# Patient Record
Sex: MALE | Race: WHITE | NOT HISPANIC OR LATINO | Employment: OTHER | URBAN - NONMETROPOLITAN AREA
[De-identification: names, ages, dates, MRNs, and addresses within clinical notes are randomized per-mention and may not be internally consistent; named-entity substitution may affect disease eponyms.]

---

## 2017-06-01 ENCOUNTER — TELEPHONE (OUTPATIENT)
Dept: SURGERY | Facility: CLINIC | Age: 62
End: 2017-06-01

## 2017-06-14 NOTE — TELEPHONE ENCOUNTER
Pt is scheduled for colonoscopy screening on August 9, 2017 at [facility]. Instructions mailed today. Thanks.

## 2017-06-15 ENCOUNTER — PREP FOR SURGERY (OUTPATIENT)
Dept: OTHER | Facility: HOSPITAL | Age: 62
End: 2017-06-15

## 2017-06-15 DIAGNOSIS — Z12.11 ENCOUNTER FOR SCREENING COLONOSCOPY: Primary | ICD-10-CM

## 2017-06-15 RX ORDER — SODIUM CHLORIDE 9 MG/ML
30 INJECTION, SOLUTION INTRAVENOUS CONTINUOUS PRN
Status: CANCELLED | OUTPATIENT
Start: 2017-06-15

## 2017-06-16 ENCOUNTER — HOSPITAL ENCOUNTER (OUTPATIENT)
Facility: HOSPITAL | Age: 62
Setting detail: HOSPITAL OUTPATIENT SURGERY
End: 2017-06-16
Attending: SURGERY | Admitting: SURGERY

## 2017-06-27 ENCOUNTER — OFFICE VISIT (OUTPATIENT)
Dept: UROLOGY | Facility: CLINIC | Age: 62
End: 2017-06-27

## 2017-06-27 VITALS
HEART RATE: 78 BPM | RESPIRATION RATE: 18 BRPM | DIASTOLIC BLOOD PRESSURE: 75 MMHG | SYSTOLIC BLOOD PRESSURE: 146 MMHG | OXYGEN SATURATION: 96 % | TEMPERATURE: 98.8 F

## 2017-06-27 DIAGNOSIS — R97.20 ELEVATED PROSTATE SPECIFIC ANTIGEN (PSA): Primary | ICD-10-CM

## 2017-06-27 DIAGNOSIS — N40.1 BPH (BENIGN PROSTATIC HYPERTROPHY) WITH URINARY OBSTRUCTION: ICD-10-CM

## 2017-06-27 DIAGNOSIS — N21.0 BLADDER CALCULUS: ICD-10-CM

## 2017-06-27 DIAGNOSIS — N13.8 BPH (BENIGN PROSTATIC HYPERTROPHY) WITH URINARY OBSTRUCTION: ICD-10-CM

## 2017-06-27 LAB
BILIRUB BLD-MCNC: NEGATIVE MG/DL
CLARITY, POC: CLEAR
COLOR UR: YELLOW
GLUCOSE UR STRIP-MCNC: NEGATIVE MG/DL
KETONES UR QL: NEGATIVE
LEUKOCYTE EST, POC: NEGATIVE
NITRITE UR-MCNC: NEGATIVE MG/ML
PH UR: 7.5 [PH] (ref 5–8)
PROT UR STRIP-MCNC: NEGATIVE MG/DL
RBC # UR STRIP: NEGATIVE /UL
SP GR UR: 1.01 (ref 1–1.03)
UROBILINOGEN UR QL: NORMAL

## 2017-06-27 PROCEDURE — 76857 US EXAM PELVIC LIMITED: CPT | Performed by: UROLOGY

## 2017-06-27 PROCEDURE — 99203 OFFICE O/P NEW LOW 30 MIN: CPT | Performed by: UROLOGY

## 2017-06-27 PROCEDURE — 81003 URINALYSIS AUTO W/O SCOPE: CPT | Performed by: UROLOGY

## 2017-06-27 RX ORDER — DOXAZOSIN MESYLATE 4 MG/1
TABLET ORAL
COMMUNITY
Start: 2017-04-30 | End: 2017-06-27 | Stop reason: SDUPTHER

## 2017-06-27 RX ORDER — CLOBETASOL PROPIONATE 0.46 MG/ML
SOLUTION TOPICAL
COMMUNITY
Start: 2017-05-26

## 2017-06-27 RX ORDER — ALFUZOSIN HYDROCHLORIDE 10 MG/1
10 TABLET, EXTENDED RELEASE ORAL DAILY
Qty: 30 TABLET | Refills: 11 | Status: SHIPPED | OUTPATIENT
Start: 2017-06-27 | End: 2017-07-17

## 2017-06-27 RX ORDER — TAMSULOSIN HYDROCHLORIDE 0.4 MG/1
CAPSULE ORAL
COMMUNITY
Start: 2017-06-11 | End: 2017-06-27 | Stop reason: SDUPTHER

## 2017-06-27 RX ORDER — FINASTERIDE 5 MG/1
5 TABLET, FILM COATED ORAL DAILY
Qty: 30 TABLET | Refills: 11 | Status: SHIPPED | OUTPATIENT
Start: 2017-06-27 | End: 2018-03-23 | Stop reason: SDUPTHER

## 2017-06-27 NOTE — PROGRESS NOTES
Chief Complaint  Elevated PSA (Patient is here for elevated psa.)        JOLEEN Beltrán is a 61 y.o. male who     Vitals:    06/27/17 1125   BP: 146/75   Pulse: 78   Resp: 18   Temp: 98.8 °F (37.1 °C)   SpO2: 96%       Past Medical History  Past Medical History:   Diagnosis Date   • Arthritis    • Kidney stone        Past Surgical History  Past Surgical History:   Procedure Laterality Date   • KIDNEY STONE SURGERY         Medications  has a current medication list which includes the following prescription(s): clobetasol, doxazosin, and tamsulosin.      Allergies  No Known Allergies    Social History  Social History     Social History Narrative   • No narrative on file       Family History  He has no family history of bladder or kidney cancer  He has no family history of kidney stones      AUA Symptom Score:      Review of Systems  Review of Systems    Physical Exam  Physical Exam    Labs Recent and today in the office:  Results for orders placed or performed in visit on 06/27/17   POC Urinalysis Dipstick, Automated   Result Value Ref Range    Color Yellow Yellow, Straw, Dark Yellow, Alison    Clarity, UA Clear Clear    Glucose, UA Negative Negative, 1000 mg/dL (3+) mg/dL    Bilirubin Negative Negative    Ketones, UA Negative Negative    Specific Gravity  1.015 1.005 - 1.030    Blood, UA Negative Negative    pH, Urine 7.5 5.0 - 8.0    Protein, POC Negative Negative mg/dL    Urobilinogen, UA Normal Normal    Leukocytes Negative Negative    Nitrite, UA Negative Negative         Assessment & Plan

## 2017-06-27 NOTE — PROGRESS NOTES
[facility]- UROLOGY  [address], [address]  [place], [place] [address]  [phone]      06/27/2017    Kamar Zamoraots  1955        Pelvic Ultrasound with PVR    A transabdominal pelvic ultrasound was performed using a 3.5 MHz transducer of a B-K Mcgovern through the suprapubic area.     The purpose of the study was to investigate the patient's voiding difficulties.  There was mild bladder wall thickening noted.  There were no intravesical filling defects seen except for a 5 mm bladder calculus.  The post void residual of 67.6 ml was noted.  Prostate was huge and homogeneous and was noted to be 1-3 grams.  There was a protrusion of the prostate into the bladder.  Ultrasound images will be scanned into the chart for reference.       CPT code 23128        Performed by Jj Barksdale MD

## 2017-06-27 NOTE — PROGRESS NOTES
Chief Complaint  No chief complaint on file.        JOLEEN Beltrán is a 61 y.o. male who is referred for evaluation of his prostate after a recent PSA was found elevated to 7.3.  He has been formally evaluated both at the [facility] and by Dr. Wiley and was found to have a PSA of 7.2 in 2013.  He has moderate symptoms of outlet obstruction related to his enlarged prostate and describes an AUA obstructive index of 30 despite taking Flomax.  He also complains of lack of ejaculation since starting Flomax.  His past history significant for kidney stones that required operative intervention as well as bladder stones which have been treated both endoscopically and open.  On his last cystoscopy he was told there was a residual bladder stone but he did not go back for follow-up.    There were no vitals filed for this visit.    Past Medical History  No past medical history on file.    Past Surgical History  No past surgical history on file.    Medications  currently has no medications in their medication list.      Allergies  Allergies not on file    Social History  Social History     Social History Narrative       Family History  He has no family history of bladder or kidney cancer  He has no family history of kidney stones      AUA Symptom Score:      Review of Systems  Review of Systems  Positive for painful joints frequent urination and decreased stream involuntary urination easy bleeding and bruising but negative and other categories.  Physical Exam  Physical Exam   Constitutional: He is oriented to person, place, and time. He appears well-developed and well-nourished.   HENT:   Head: Normocephalic and atraumatic.   Neck: Normal range of motion.   Pulmonary/Chest: Effort normal. No respiratory distress.   Abdominal: Soft. He exhibits no distension and no mass. There is no tenderness. No hernia.   Musculoskeletal: Normal range of motion.   Lymphadenopathy:     He has no cervical adenopathy.   Neurological: He is  alert and oriented to person, place, and time.   Skin: Skin is warm and dry.   Psychiatric: He has a normal mood and affect. His behavior is normal.   Vitals reviewed.      Labs Recent and today in the office:  No results found for this or any previous visit.      Assessment & Plan  Digital rectal exam is benign and reveals a huge prostate documented on pelvic ultrasound to be 123 g.  With a PSA of 7.3 his PSA density is well within the benign range.  I would not recommend biopsy at this time but he needs to be on medication to shrink his huge prostate and his PSA will probably come down to normal.  If it continues to progress on Proscar and then I would recommend proceeding with biopsy at that time.  I would also recommend discontinuing the Flomax and take Uroxatral since this has less effect on his sexual function which is a concern.    He does have symptoms of hypogonadism and I suggested he return in 3 months for a total and free PSA along with total and free testosterone level.

## 2017-07-04 ENCOUNTER — HOSPITAL ENCOUNTER (EMERGENCY)
Facility: HOSPITAL | Age: 62
Discharge: HOME | End: 2017-07-04
Attending: EMERGENCY MEDICINE | Admitting: EMERGENCY MEDICINE

## 2017-07-04 VITALS
WEIGHT: 190 LBS | DIASTOLIC BLOOD PRESSURE: 88 MMHG | BODY MASS INDEX: 26.6 KG/M2 | HEART RATE: 63 BPM | HEIGHT: 71 IN | TEMPERATURE: 98 F | SYSTOLIC BLOOD PRESSURE: 155 MMHG | OXYGEN SATURATION: 97 % | RESPIRATION RATE: 20 BRPM

## 2017-07-04 DIAGNOSIS — R33.9 URINARY RETENTION: Primary | ICD-10-CM

## 2017-07-04 LAB
ALBUMIN SERPL-MCNC: 4.4 G/DL (ref 3.5–5)
ALBUMIN/GLOB SERPL: 1.4 G/DL (ref 1–2)
ALP SERPL-CCNC: 64 U/L (ref 38–126)
ALT SERPL W P-5'-P-CCNC: 37 U/L (ref 13–69)
ANION GAP SERPL CALCULATED.3IONS-SCNC: 15.1 MMOL/L
AST SERPL-CCNC: 31 U/L (ref 15–46)
BASOPHILS # BLD AUTO: 0.05 10*3/MM3 (ref 0–0.2)
BASOPHILS NFR BLD AUTO: 0.7 % (ref 0–2.5)
BILIRUB SERPL-MCNC: 0.5 MG/DL (ref 0.2–1.3)
BILIRUB UR QL STRIP: NEGATIVE
BUN BLD-MCNC: 17 MG/DL (ref 7–20)
BUN/CREAT SERPL: 18.9 (ref 6.3–21.9)
CALCIUM SPEC-SCNC: 9.6 MG/DL (ref 8.4–10.2)
CHLORIDE SERPL-SCNC: 103 MMOL/L (ref 98–107)
CLARITY UR: CLEAR
CO2 SERPL-SCNC: 24 MMOL/L (ref 26–30)
COLOR UR: NORMAL
CREAT BLD-MCNC: 0.9 MG/DL (ref 0.6–1.3)
DEPRECATED RDW RBC AUTO: 40.5 FL (ref 37–54)
EOSINOPHIL # BLD AUTO: 0.34 10*3/MM3 (ref 0–0.7)
EOSINOPHIL NFR BLD AUTO: 4.5 % (ref 0–7)
ERYTHROCYTE [DISTWIDTH] IN BLOOD BY AUTOMATED COUNT: 12.7 % (ref 11.5–14.5)
GFR SERPL CREATININE-BSD FRML MDRD: 85 ML/MIN/1.73
GLOBULIN UR ELPH-MCNC: 3.2 GM/DL
GLUCOSE BLD-MCNC: 102 MG/DL (ref 74–98)
GLUCOSE UR STRIP-MCNC: NEGATIVE MG/DL
HCT VFR BLD AUTO: 43.6 % (ref 42–52)
HGB BLD-MCNC: 15.2 G/DL (ref 14–18)
HGB UR QL STRIP.AUTO: NEGATIVE
HOLD SPECIMEN: NORMAL
IMM GRANULOCYTES # BLD: 0.03 10*3/MM3 (ref 0–0.06)
IMM GRANULOCYTES NFR BLD: 0.4 % (ref 0–0.6)
KETONES UR QL STRIP: NEGATIVE
LEUKOCYTE ESTERASE UR QL STRIP.AUTO: NEGATIVE
LYMPHOCYTES # BLD AUTO: 2.29 10*3/MM3 (ref 0.6–3.4)
LYMPHOCYTES NFR BLD AUTO: 30.5 % (ref 10–50)
MCH RBC QN AUTO: 30.6 PG (ref 27–31)
MCHC RBC AUTO-ENTMCNC: 34.9 G/DL (ref 30–37)
MCV RBC AUTO: 87.7 FL (ref 80–94)
MONOCYTES # BLD AUTO: 0.58 10*3/MM3 (ref 0–0.9)
MONOCYTES NFR BLD AUTO: 7.7 % (ref 0–12)
NEUTROPHILS # BLD AUTO: 4.23 10*3/MM3 (ref 2–6.9)
NEUTROPHILS NFR BLD AUTO: 56.2 % (ref 37–80)
NITRITE UR QL STRIP: NEGATIVE
NRBC BLD MANUAL-RTO: 0 /100 WBC (ref 0–0)
PH UR STRIP.AUTO: 7.5 [PH] (ref 5–8)
PLATELET # BLD AUTO: 206 10*3/MM3 (ref 130–400)
PMV BLD AUTO: 10.5 FL (ref 6–12)
POTASSIUM BLD-SCNC: 4.1 MMOL/L (ref 3.5–5.1)
PROT SERPL-MCNC: 7.6 G/DL (ref 6.3–8.2)
PROT UR QL STRIP: NEGATIVE
RBC # BLD AUTO: 4.97 10*6/MM3 (ref 4.7–6.1)
SODIUM BLD-SCNC: 138 MMOL/L (ref 137–145)
SP GR UR STRIP: 1.01 (ref 1–1.03)
UROBILINOGEN UR QL STRIP: NORMAL
WBC NRBC COR # BLD: 7.52 10*3/MM3 (ref 4.8–10.8)
WHOLE BLOOD HOLD SPECIMEN: NORMAL

## 2017-07-04 PROCEDURE — 81003 URINALYSIS AUTO W/O SCOPE: CPT | Performed by: EMERGENCY MEDICINE

## 2017-07-04 PROCEDURE — 85025 COMPLETE CBC W/AUTO DIFF WBC: CPT | Performed by: EMERGENCY MEDICINE

## 2017-07-04 PROCEDURE — 80053 COMPREHEN METABOLIC PANEL: CPT | Performed by: EMERGENCY MEDICINE

## 2017-07-04 PROCEDURE — 99283 EMERGENCY DEPT VISIT LOW MDM: CPT

## 2017-07-04 RX ORDER — NAPROXEN 500 MG/1
500 TABLET ORAL 2 TIMES DAILY WITH MEALS
COMMUNITY

## 2017-07-05 NOTE — ED NOTES
Patient aware of MD recommendation for straight cath to drain urine prior to discharge, patient refused.      Juan Alberto Paul RN  07/04/17 8568

## 2017-07-05 NOTE — ED PROVIDER NOTES
TRIAGE CHIEF COMPLAINT:     Nursing and triage notes reviewed    Chief Complaint   Patient presents with   • Difficulty Urinating      HPI: Kamar Beltrán is a 61 y.o. male who presents to the emergency department complaining of Difficulty urinating.  Patient states he has a history of an enlarged prostate for which she is followed by urology.  He saw his urologist last Tuesday and was started on 2 medicines for his enlarged prostate.  He states since Saturday, approximately for the past 5 days has had difficulty in urinating.  States she has some pressure in the suprapubic area and some slight aching in his left flank.  He denies having any blood in the urine he has been able to produce.  States he has a history of kidney stones but his discomfort does not feel like a kidney stone.  Denies any nausea or vomiting.  No fevers or chills.  No chest pain or shortness of breath.     REVIEW OF SYSTEMS: Otherwise negative unless stated above     PAST MEDICAL HISTORY:   Past Medical History:   Diagnosis Date   • Arthritis    • Enlarged prostate    • Kidney stone         FAMILY HISTORY:   Family History   Problem Relation Age of Onset   • Heart disease Mother    • Hypertension Mother    • Diabetes Mother         SOCIAL HISTORY:   Social History     Social History   • Marital status:      Spouse name: N/A   • Number of children: N/A   • Years of education: N/A     Occupational History   • Not on file.     Social History Main Topics   • Smoking status: Never Smoker   • Smokeless tobacco: Not on file   • Alcohol use No   • Drug use: No   • Sexual activity: No     Other Topics Concern   • Not on file     Social History Narrative   • No narrative on file        SURGICAL HISTORY:   Past Surgical History:   Procedure Laterality Date   • KIDNEY STONE SURGERY          CURRENT MEDICATIONS:      Medication List      ASK your doctor about these medications          alfuzosin 10 MG 24 hr tablet   Commonly known as:  UROXATRAL    Take 1 tablet by mouth Daily.       clobetasol 0.05 % external solution   Commonly known as:  TEMOVATE       finasteride 5 MG tablet   Commonly known as:  PROSCAR   Take 1 tablet by mouth Daily.       naproxen 500 MG tablet   Commonly known as:  NAPROSYN            ALLERGIES: Review of patient's allergies indicates no known allergies.     PHYSICAL EXAM:   VITAL SIGNS:   Vitals:    07/04/17 2059   BP:    Pulse:    Resp: 20   Temp:    SpO2:       CONSTITUTIONAL: Awake, oriented, appears non-toxic   HENT: Atraumatic, normocephalic, oral mucosa pink and moist, airway patent.   EYES: Conjunctiva clear   NECK: Trachea midline, non-tender, supple   CARDIOVASCULAR: Normal heart rate, Normal rhythm, No murmurs, rubs, gallops   PULMONARY/CHEST: Clear to auscultation, no rhonchi, wheezes, or rales. Symmetrical breath sounds.  ABDOMINAL: Non-distended, soft, mild suprapubic tenderness - no rebound or guarding.  Mild left flank discomfort to palpation.  NEUROLOGIC: Non-focal, moving all four extremities, no gross sensory or motor deficits.   EXTREMITIES: No clubbing, cyanosis, or edema   SKIN: Warm, Dry, No erythema, No rash     ED COURSE / MEDICAL DECISION MAKING:   Kamar Beltrán is a 61 y.o. male who presents to the emergency department for evaluation of urinary retention.  Vital signs stable on arrival.  Physical exam reveals some slight suprapubic tenderness to palpation.  Did not feel imaging is currently indicated but did obtain some basic labs.  Also obtained a bladder scan which revealed approximately 200 cc of urine in the bladder.  Patient was able to void some on his own.  Basic labs were unremarkable including kidney function.  No sign of urinary infection.  After discussion with patient I recommended placement of a Cruz catheter at least for decompression of the bladder however the patient refused.  He requested to go back on Flomax as he thought this worked better and will follow-up with his urologist.  As  patient to return at any point for worsening of symptoms.    DECISION TO DISCHARGE/ADMIT: see ED care timeline     FINAL IMPRESSION:   1 -- urinary retention   2 --   3 --     Electronically signed by: Aby Claros MD, 7/4/2017 9:07 PM       Aby Claros MD  07/04/17 2237

## 2017-07-17 ENCOUNTER — OFFICE VISIT (OUTPATIENT)
Dept: UROLOGY | Facility: CLINIC | Age: 62
End: 2017-07-17

## 2017-07-17 VITALS
HEART RATE: 68 BPM | SYSTOLIC BLOOD PRESSURE: 125 MMHG | WEIGHT: 190 LBS | HEIGHT: 71 IN | BODY MASS INDEX: 26.6 KG/M2 | TEMPERATURE: 98.8 F | DIASTOLIC BLOOD PRESSURE: 80 MMHG | OXYGEN SATURATION: 94 %

## 2017-07-17 DIAGNOSIS — N40.1 BPH (BENIGN PROSTATIC HYPERTROPHY) WITH URINARY OBSTRUCTION: ICD-10-CM

## 2017-07-17 DIAGNOSIS — R33.8 ACUTE URINARY RETENTION: Primary | ICD-10-CM

## 2017-07-17 DIAGNOSIS — N13.8 BPH (BENIGN PROSTATIC HYPERTROPHY) WITH URINARY OBSTRUCTION: ICD-10-CM

## 2017-07-17 PROCEDURE — 99213 OFFICE O/P EST LOW 20 MIN: CPT | Performed by: UROLOGY

## 2017-07-17 RX ORDER — TAMSULOSIN HYDROCHLORIDE 0.4 MG/1
CAPSULE ORAL
COMMUNITY
Start: 2017-07-08

## 2017-07-17 NOTE — PROGRESS NOTES
Chief Complaint  Urinary Retention (Patient has been taking flomax and would like to have nesbitt removed.)        JOLEEN Beltrán is a 61 y.o. male who is known have a huge prostate with symptoms of outlet obstruction that developed urinary retention since his last visit.  He denies constipation or starting new medications but does admit to drinking an unusually large amount of water and get his bladder overdistended.  A Nesbitt catheter was inserted at the [place] emergency room and apparently several bags of urine were obtained.  He returns today requesting Nesbitt removal.  He understands he has a huge prostate at 123 grams  and if we don't give the Proscar and Flomax time to work he will need to proceed with surgery or have the catheter.    Vitals:    07/17/17 0952   BP: 125/80   Pulse: 68   Temp: 98.8 °F (37.1 °C)   SpO2: 94%       Past Medical History  Past Medical History:   Diagnosis Date   • Arthritis    • Enlarged prostate    • Kidney stone        Past Surgical History  Past Surgical History:   Procedure Laterality Date   • KIDNEY STONE SURGERY         Medications  has a current medication list which includes the following prescription(s): clobetasol, finasteride, naproxen, and tamsulosin.      Allergies  No Known Allergies    Social History  Social History     Social History Narrative       Family History  He has no family history of bladder or kidney cancer  He has no family history of kidney stones      AUA Symptom Score:      Review of Systems  Review of Systems   Constitutional: Negative.    Gastrointestinal: Negative for constipation.   Genitourinary: Positive for difficulty urinating, frequency, penile pain and urgency.   All other systems reviewed and are negative.      Physical Exam  Physical Exam   Constitutional: He is oriented to person, place, and time. He appears well-developed and well-nourished.   HENT:   Head: Normocephalic and atraumatic.   Neck: Normal range of motion.   Pulmonary/Chest: Effort  normal. No respiratory distress.   Abdominal: Soft. He exhibits no distension and no mass. There is no tenderness. No hernia.   Genitourinary: Penis normal.   Musculoskeletal: Normal range of motion.   Lymphadenopathy:     He has no cervical adenopathy.   Neurological: He is alert and oriented to person, place, and time.   Skin: Skin is warm and dry.   Psychiatric: He has a normal mood and affect. His behavior is normal.   Vitals reviewed.      Labs Recent and today in the office:  Results for orders placed or performed during the hospital encounter of 07/04/17   Comprehensive Metabolic Panel   Result Value Ref Range    Glucose 102 (H) 74 - 98 mg/dL    BUN 17 7 - 20 mg/dL    Creatinine 0.90 0.60 - 1.30 mg/dL    Sodium 138 137 - 145 mmol/L    Potassium 4.1 3.5 - 5.1 mmol/L    Chloride 103 98 - 107 mmol/L    CO2 24.0 (L) 26.0 - 30.0 mmol/L    Calcium 9.6 8.4 - 10.2 mg/dL    Total Protein 7.6 6.3 - 8.2 g/dL    Albumin 4.40 3.50 - 5.00 g/dL    ALT (SGPT) 37 13 - 69 U/L    AST (SGOT) 31 15 - 46 U/L    Alkaline Phosphatase 64 38 - 126 U/L    Total Bilirubin 0.5 0.2 - 1.3 mg/dL    eGFR Non African Amer 85 >60 mL/min/1.73    Globulin 3.2 gm/dL    A/G Ratio 1.4 1.0 - 2.0 g/dL    BUN/Creatinine Ratio 18.9 6.3 - 21.9    Anion Gap 15.1 mmol/L   Urinalysis With / Culture If Indicated   Result Value Ref Range    Color, UA Straw Yellow, Straw    Appearance, UA Clear Clear    pH, UA 7.5 5.0 - 8.0    Specific Gravity, UA 1.015 1.005 - 1.030    Glucose, UA Negative Negative    Ketones, UA Negative Negative    Bilirubin, UA Negative Negative    Blood, UA Negative Negative    Protein, UA Negative Negative    Leuk Esterase, UA Negative Negative    Nitrite, UA Negative Negative    Urobilinogen, UA 0.2 E.U./dL 0.2 - 1.0 E.U./dL   CBC Auto Differential   Result Value Ref Range    WBC 7.52 4.80 - 10.80 10*3/mm3    RBC 4.97 4.70 - 6.10 10*6/mm3    Hemoglobin 15.2 14.0 - 18.0 g/dL    Hematocrit 43.6 42.0 - 52.0 %    MCV 87.7 80.0 - 94.0 fL     MCH 30.6 27.0 - 31.0 pg    MCHC 34.9 30.0 - 37.0 g/dL    RDW 12.7 11.5 - 14.5 %    RDW-SD 40.5 37.0 - 54.0 fl    MPV 10.5 6.0 - 12.0 fL    Platelets 206 130 - 400 10*3/mm3    Neutrophil % 56.2 37.0 - 80.0 %    Lymphocyte % 30.5 10.0 - 50.0 %    Monocyte % 7.7 0.0 - 12.0 %    Eosinophil % 4.5 0.0 - 7.0 %    Basophil % 0.7 0.0 - 2.5 %    Immature Grans % 0.4 0.0 - 0.6 %    Neutrophils, Absolute 4.23 2.00 - 6.90 10*3/mm3    Lymphocytes, Absolute 2.29 0.60 - 3.40 10*3/mm3    Monocytes, Absolute 0.58 0.00 - 0.90 10*3/mm3    Eosinophils, Absolute 0.34 0.00 - 0.70 10*3/mm3    Basophils, Absolute 0.05 0.00 - 0.20 10*3/mm3    Immature Grans, Absolute 0.03 0.00 - 0.06 10*3/mm3    nRBC 0.0 0.0 - 0.0 /100 WBC   Light Blue Top   Result Value Ref Range    Extra Tube hold for add-on    Gold Top - SST   Result Value Ref Range    Extra Tube Hold for add-ons.          Assessment & Plan  He also feels like he Flomax may work better than Uroxatral and therefore tolerate the sexual side effects for better improvement in voiding.  He will return in 1 month for catheter change or monitoring if he is able to void adequately.

## 2017-07-24 ENCOUNTER — OFFICE VISIT (OUTPATIENT)
Dept: UROLOGY | Facility: CLINIC | Age: 62
End: 2017-07-24

## 2017-07-24 VITALS
SYSTOLIC BLOOD PRESSURE: 127 MMHG | HEART RATE: 85 BPM | OXYGEN SATURATION: 96 % | TEMPERATURE: 98.3 F | BODY MASS INDEX: 26.6 KG/M2 | HEIGHT: 71 IN | DIASTOLIC BLOOD PRESSURE: 85 MMHG | WEIGHT: 190 LBS

## 2017-07-24 DIAGNOSIS — R30.0 DYSURIA: Primary | ICD-10-CM

## 2017-07-24 DIAGNOSIS — N30.00 ACUTE CYSTITIS WITHOUT HEMATURIA: ICD-10-CM

## 2017-07-24 LAB
BILIRUB BLD-MCNC: NEGATIVE MG/DL
CLARITY, POC: CLEAR
COLOR UR: YELLOW
GLUCOSE UR STRIP-MCNC: NEGATIVE MG/DL
KETONES UR QL: NEGATIVE
LEUKOCYTE EST, POC: ABNORMAL
NITRITE UR-MCNC: POSITIVE MG/ML
PH UR: 6 [PH] (ref 5–8)
PROT UR STRIP-MCNC: ABNORMAL MG/DL
RBC # UR STRIP: ABNORMAL /UL
SP GR UR: 1.03 (ref 1–1.03)
UROBILINOGEN UR QL: NORMAL

## 2017-07-24 PROCEDURE — 99213 OFFICE O/P EST LOW 20 MIN: CPT | Performed by: UROLOGY

## 2017-07-24 PROCEDURE — 51798 US URINE CAPACITY MEASURE: CPT | Performed by: UROLOGY

## 2017-07-24 RX ORDER — TERBINAFINE HYDROCHLORIDE 250 MG/1
TABLET ORAL
COMMUNITY
Start: 2017-07-18

## 2017-07-24 RX ORDER — KETOCONAZOLE 20 MG/ML
SHAMPOO TOPICAL
COMMUNITY
Start: 2017-07-18

## 2017-07-24 RX ORDER — SULFAMETHOXAZOLE AND TRIMETHOPRIM 400; 80 MG/1; MG/1
1 TABLET ORAL 2 TIMES DAILY
Qty: 20 TABLET | Refills: 0 | Status: SHIPPED | OUTPATIENT
Start: 2017-07-24 | End: 2017-08-03

## 2017-07-24 NOTE — PROGRESS NOTES
Chief Complaint  Difficulty Urinating (dysuria, painful urination.)        JOLEEN Beltrán is a 61 y.o. male who turns today complaining of dysuria several weeks after a Cruz catheter was removed for urinary retention.  He's known have a huge prostate wait too big for TURP and were hoping to avoid a abdominal procedure that shrinking the gland with finasteride in treating his voiding with Flomax.  He swears he is voiding better and doesn't want a catheter back in.  He's had a past history of bladder stones and appears to have one about 5 mm in diameter currently as judged with recent pelvic ultrasound.    Vitals:    07/24/17 1037   BP: 127/85   Pulse: 85   Temp: 98.3 °F (36.8 °C)   SpO2: 96%       Past Medical History  Past Medical History:   Diagnosis Date   • Arthritis    • Enlarged prostate    • Kidney stone        Past Surgical History  Past Surgical History:   Procedure Laterality Date   • KIDNEY STONE SURGERY         Medications  has a current medication list which includes the following prescription(s): clobetasol, finasteride, ketoconazole, naproxen, tamsulosin, and terbinafine.      Allergies  No Known Allergies    Social History  Social History     Social History Narrative       Family History  He has no family history of bladder or kidney cancer  He has no family history of kidney stones      AUA Symptom Score:      Review of Systems  Review of Systems    Physical Exam  Physical Exam    Labs Recent and today in the office:  Results for orders placed or performed in visit on 07/24/17   POC Urinalysis Dipstick, Automated   Result Value Ref Range    Color Yellow Yellow, Straw, Dark Yellow, Alison    Clarity, UA Clear Clear    Glucose, UA Negative Negative, 1000 mg/dL (3+) mg/dL    Bilirubin Negative Negative    Ketones, UA Negative Negative    Specific Gravity  1.030 1.005 - 1.030    Blood, UA 3+ (A) Negative    pH, Urine 6.0 5.0 - 8.0    Protein, POC 1+ (A) Negative mg/dL    Urobilinogen, UA Normal Normal     Leukocytes Large (3+) (A) Negative    Nitrite, UA Positive (A) Negative       Bladder scan reveals only 83 mL postvoid residual  Assessment & Plan  He has an obvious urinary tract infection so was started on Bactrim pending the results of culture.  He'll continue the Proscar and Flomax and keep his next appointment.

## 2017-07-26 LAB
BACTERIA UR CULT: ABNORMAL
BACTERIA UR CULT: ABNORMAL
OTHER ANTIBIOTIC SUSC ISLT: ABNORMAL

## 2017-08-03 ENCOUNTER — TELEPHONE (OUTPATIENT)
Dept: SURGERY | Facility: CLINIC | Age: 62
End: 2017-08-03

## 2017-08-03 NOTE — TELEPHONE ENCOUNTER
Patient's wife called to say he wants to cancel his procedure.  He didn't want to reschedule. She said he changed his mind. Thanks.

## 2017-08-17 ENCOUNTER — OFFICE VISIT (OUTPATIENT)
Dept: UROLOGY | Facility: CLINIC | Age: 62
End: 2017-08-17

## 2017-08-17 VITALS
SYSTOLIC BLOOD PRESSURE: 128 MMHG | TEMPERATURE: 98.6 F | HEIGHT: 71 IN | HEART RATE: 77 BPM | BODY MASS INDEX: 26.6 KG/M2 | DIASTOLIC BLOOD PRESSURE: 71 MMHG | OXYGEN SATURATION: 96 % | WEIGHT: 190 LBS

## 2017-08-17 DIAGNOSIS — N30.00 ACUTE CYSTITIS WITHOUT HEMATURIA: ICD-10-CM

## 2017-08-17 DIAGNOSIS — N21.0 BLADDER CALCULUS: ICD-10-CM

## 2017-08-17 DIAGNOSIS — Z87.898 HISTORY OF URINARY RETENTION: Primary | ICD-10-CM

## 2017-08-17 LAB
BILIRUB BLD-MCNC: NEGATIVE MG/DL
CLARITY, POC: ABNORMAL
COLOR UR: YELLOW
GLUCOSE UR STRIP-MCNC: NEGATIVE MG/DL
KETONES UR QL: NEGATIVE
LEUKOCYTE EST, POC: ABNORMAL
NITRITE UR-MCNC: NEGATIVE MG/ML
PH UR: 6 [PH] (ref 5–8)
PROT UR STRIP-MCNC: NEGATIVE MG/DL
RBC # UR STRIP: ABNORMAL /UL
SP GR UR: 1.03 (ref 1–1.03)
UROBILINOGEN UR QL: NORMAL

## 2017-08-17 PROCEDURE — 99213 OFFICE O/P EST LOW 20 MIN: CPT | Performed by: UROLOGY

## 2017-08-17 RX ORDER — KETOCONAZOLE 20 MG/G
CREAM TOPICAL
COMMUNITY
Start: 2017-08-16

## 2017-08-17 RX ORDER — NITROFURANTOIN 25; 75 MG/1; MG/1
100 CAPSULE ORAL 2 TIMES DAILY
Qty: 20 CAPSULE | Refills: 0 | Status: SHIPPED | OUTPATIENT
Start: 2017-08-17 | End: 2017-08-27

## 2017-08-17 NOTE — PROGRESS NOTES
Chief Complaint  Urinary Retention (history of urinary retention.)        JOLEEN Beltrán is a 62 y.o. male who returns today for follow-up with a history of urinary retention and urinary tract infection associated with a huge prostate at 125 g as well as a small bladder stone.  Since taking Proscar and Flomax he states his stream is dramatically improved.  He was treated with Bactrim for staph aureus UTI in July and returns today with his urine infected.  Hopefully we can avoid prostate surgery quickly since his gland is too large for TURP.      He had an elevated PSA at the time of his original presentation but a subsequent test was 6.5 producing a PSA density in the benign range.  Vitals:    08/17/17 1130   BP: 128/71   Pulse: 77   Temp: 98.6 °F (37 °C)   SpO2: 96%       Past Medical History  Past Medical History:   Diagnosis Date   • Arthritis    • Enlarged prostate    • Kidney stone        Past Surgical History  Past Surgical History:   Procedure Laterality Date   • KIDNEY STONE SURGERY         Medications  has a current medication list which includes the following prescription(s): clobetasol, finasteride, ketoconazole, ketoconazole, naproxen, tamsulosin, and terbinafine.      Allergies  No Known Allergies    Social History  Social History     Social History Narrative       Family History  He has no family history of bladder or kidney cancer  He has no family history of kidney stones      AUA Symptom Score:      Review of Systems  Review of Systems   Constitutional: Negative.    Genitourinary: Negative.    All other systems reviewed and are negative.      Physical Exam  Physical Exam    Labs Recent and today in the office:  Results for orders placed or performed in visit on 08/17/17   POC Urinalysis Dipstick, Automated   Result Value Ref Range    Color Yellow Yellow, Straw, Dark Yellow, Alison    Clarity, UA Cloudy (A) Clear    Glucose, UA Negative Negative, 1000 mg/dL (3+) mg/dL    Bilirubin Negative Negative     Ketones, UA Negative Negative    Specific Gravity  1.030 1.005 - 1.030    Blood, UA 1+ (A) Negative    pH, Urine 6.0 5.0 - 8.0    Protein, POC Negative Negative mg/dL    Urobilinogen, UA Normal Normal    Leukocytes Large (3+) (A) Negative    Nitrite, UA Negative Negative         Assessment & Plan    I suggested Macrobid pending the results of the culture and then return for cystoscopy.     I suspect he is going to need alaser lithotripsy of the small bladder calculus before he can clear his UTI.

## 2017-08-19 LAB
BACTERIA UR CULT: ABNORMAL
BACTERIA UR CULT: ABNORMAL
OTHER ANTIBIOTIC SUSC ISLT: ABNORMAL

## 2017-08-29 ENCOUNTER — PROCEDURE VISIT (OUTPATIENT)
Dept: UROLOGY | Facility: CLINIC | Age: 62
End: 2017-08-29

## 2017-08-29 VITALS
OXYGEN SATURATION: 98 % | TEMPERATURE: 98.6 F | DIASTOLIC BLOOD PRESSURE: 88 MMHG | WEIGHT: 190 LBS | BODY MASS INDEX: 26.5 KG/M2 | HEART RATE: 87 BPM | SYSTOLIC BLOOD PRESSURE: 128 MMHG

## 2017-08-29 DIAGNOSIS — N40.0 ENLARGED PROSTATE: Primary | ICD-10-CM

## 2017-08-29 DIAGNOSIS — N21.0 BLADDER CALCULUS: ICD-10-CM

## 2017-08-29 LAB
BILIRUB BLD-MCNC: NEGATIVE MG/DL
CLARITY, POC: CLEAR
COLOR UR: YELLOW
GLUCOSE UR STRIP-MCNC: NEGATIVE MG/DL
KETONES UR QL: NEGATIVE
LEUKOCYTE EST, POC: NEGATIVE
NITRITE UR-MCNC: NEGATIVE MG/ML
PH UR: 6 [PH] (ref 5–8)
PROT UR STRIP-MCNC: NEGATIVE MG/DL
RBC # UR STRIP: NEGATIVE /UL
SP GR UR: 1.02 (ref 1–1.03)
UROBILINOGEN UR QL: NORMAL

## 2017-08-29 PROCEDURE — 99213 OFFICE O/P EST LOW 20 MIN: CPT | Performed by: UROLOGY

## 2017-08-29 NOTE — PROGRESS NOTES
Chief Complaint  Benign Prostatic Hypertrophy (enlarged prostate, small bladder stone. Possible cysto, but patient wants to discuss doing the cysto at the hospital.)        JOLEEN Beltrán is a 62 y.o. male who   He returns today for follow-up with a history of recurrent urinary tract infections.  He has a huge prostate too large for TURP but has been voiding better on Proscar and Flomax.  Pelvic ultrasoud suggest a stone in the bladder so I have suggested cystoscopy to know for sure.  He returns today however symptomatically improved and with clear urine so is not anxious to have further testing at this time.    Vitals:    08/29/17 1114   BP: 128/88   Pulse: 87   Temp: 98.6 °F (37 °C)   SpO2: 98%       Past Medical History  Past Medical History:   Diagnosis Date   • Arthritis    • Enlarged prostate    • Kidney stone        Past Surgical History  Past Surgical History:   Procedure Laterality Date   • KIDNEY STONE SURGERY         Medications  has a current medication list which includes the following prescription(s): clobetasol, finasteride, ketoconazole, ketoconazole, naproxen, tamsulosin, and terbinafine.      Allergies  No Known Allergies    Social History  Social History     Social History Narrative       Family History  He has no family history of bladder or kidney cancer  He has no family history of kidney stones      AUA Symptom Score:      Review of Systems  Review of Systems   Constitutional: Negative.    Genitourinary: Negative.    All other systems reviewed and are negative.      Physical Exam  Physical Exam    Labs Recent and today in the office:  Results for orders placed or performed in visit on 08/29/17   POC Urinalysis Dipstick, Automated   Result Value Ref Range    Color Yellow Yellow, Straw, Dark Yellow, Alison    Clarity, UA Clear Clear    Glucose, UA Negative Negative, 1000 mg/dL (3+) mg/dL    Bilirubin Negative Negative    Ketones, UA Negative Negative    Specific Gravity  1.025 1.005 - 1.030    Blood,  UA Negative Negative    pH, Urine 6.0 5.0 - 8.0    Protein, POC Negative Negative mg/dL    Urobilinogen, UA Normal Normal    Leukocytes Negative Negative    Nitrite, UA Negative Negative         Assessment & Plan    His PSA has been elevated in the past but it was when he  Urinary tract infection.  His PSA density was normal even with the elevated PSA but he states his primary care provider recently checked it and it was found to be normal.  In any case he needs surveillance for UTI and if he has additional recurrences I would recommend cystoscopy confirmed bladder stone and cystoscopy litholapaxy of present.  Hopefully we can avoid prostate surgery since his too large for TURP.

## 2017-09-25 ENCOUNTER — LAB (OUTPATIENT)
Dept: UROLOGY | Facility: CLINIC | Age: 62
End: 2017-09-25

## 2017-09-25 DIAGNOSIS — R97.20 ELEVATED PSA: Primary | ICD-10-CM

## 2017-09-25 LAB — PSA SERPL-MCNC: 5.77 NG/ML (ref 0.06–4)

## 2017-09-28 ENCOUNTER — OFFICE VISIT (OUTPATIENT)
Dept: UROLOGY | Facility: CLINIC | Age: 62
End: 2017-09-28

## 2017-09-28 VITALS
TEMPERATURE: 97.6 F | SYSTOLIC BLOOD PRESSURE: 110 MMHG | HEART RATE: 80 BPM | DIASTOLIC BLOOD PRESSURE: 78 MMHG | BODY MASS INDEX: 26.6 KG/M2 | HEIGHT: 71 IN | OXYGEN SATURATION: 98 % | WEIGHT: 190 LBS

## 2017-09-28 DIAGNOSIS — Z87.898 HISTORY OF URINARY RETENTION: Primary | ICD-10-CM

## 2017-09-28 DIAGNOSIS — R97.20 ELEVATED PROSTATE SPECIFIC ANTIGEN (PSA): ICD-10-CM

## 2017-09-28 LAB
BILIRUB BLD-MCNC: NEGATIVE MG/DL
CLARITY, POC: CLEAR
COLOR UR: YELLOW
GLUCOSE UR STRIP-MCNC: NEGATIVE MG/DL
KETONES UR QL: NEGATIVE
LEUKOCYTE EST, POC: NEGATIVE
NITRITE UR-MCNC: NEGATIVE MG/ML
PH UR: 6 [PH] (ref 5–8)
PROT UR STRIP-MCNC: NEGATIVE MG/DL
RBC # UR STRIP: NEGATIVE /UL
SP GR UR: 1.03 (ref 1–1.03)
UROBILINOGEN UR QL: NORMAL

## 2017-09-28 PROCEDURE — 99213 OFFICE O/P EST LOW 20 MIN: CPT | Performed by: UROLOGY

## 2017-09-28 NOTE — PROGRESS NOTES
Chief Complaint  Urinary Retention (3 month follow-up)        JOLEEN Beltrán is a 62 y.o. male who returns today for follow-up with a known huge prostate and some hematuria after catheter was inserted.  He was started on Proscar and Flomax for a gland that seem to be 125 mg and returns today voiding better and his urine is clear he also had an elevated PSA of 6.5 at one time although with his large gland the PSA density was normal even at that level.  It was recently repeated and is down to 5.5 no doubt the result of his Proscar shrinking the gland.  In any case his PSA density is now 0.04 indicating little risk of cancer.    Vitals:    09/28/17 1120   BP: 110/78   Pulse: 80   Temp: 97.6 °F (36.4 °C)   SpO2: 98%       Past Medical History  Past Medical History:   Diagnosis Date   • Arthritis    • Enlarged prostate    • Kidney stone        Past Surgical History  Past Surgical History:   Procedure Laterality Date   • KIDNEY STONE SURGERY         Medications  has a current medication list which includes the following prescription(s): clobetasol, finasteride, ketoconazole, ketoconazole, naproxen, tamsulosin, and terbinafine.      Allergies  No Known Allergies    Social History  Social History     Social History Narrative       Family History  He has no family history of bladder or kidney cancer  He has no family history of kidney stones      AUA Symptom Score:      Review of Systems  Review of Systems   Constitutional: Negative.    Genitourinary: Negative.    All other systems reviewed and are negative.      Physical Exam  Physical Exam    Labs Recent and today in the office:  Results for orders placed or performed in visit on 09/28/17   POC Urinalysis Dipstick, Automated   Result Value Ref Range    Color Yellow Yellow, Straw, Dark Yellow, Alison    Clarity, UA Clear Clear    Glucose, UA Negative Negative, 1000 mg/dL (3+) mg/dL    Bilirubin Negative Negative    Ketones, UA Negative Negative    Specific Gravity  1.030 1.005 -  1.030    Blood, UA Negative Negative    pH, Urine 6.0 5.0 - 8.0    Protein, POC Negative Negative mg/dL    Urobilinogen, UA Normal Normal    Leukocytes Negative Negative    Nitrite, UA Negative Negative         Assessment & Plan  #1 history of urinary retention.  Less difficulty voiding despite his huge prostate now that he is taking Proscar and Flomax.  Recommended he continue these.    #2 elevated PSA: Recently measured at 5.5 which is a nice decrease from its peak and with a PSA density of 0.04 and a benign range.    He is encouraged return in 6 months and when necessary.  He should continue both of the above medications.

## 2018-03-23 ENCOUNTER — OFFICE VISIT (OUTPATIENT)
Dept: UROLOGY | Facility: CLINIC | Age: 63
End: 2018-03-23

## 2018-03-23 VITALS
RESPIRATION RATE: 16 BRPM | DIASTOLIC BLOOD PRESSURE: 77 MMHG | BODY MASS INDEX: 26.64 KG/M2 | WEIGHT: 191 LBS | SYSTOLIC BLOOD PRESSURE: 126 MMHG | TEMPERATURE: 98.6 F | HEART RATE: 68 BPM | OXYGEN SATURATION: 98 %

## 2018-03-23 DIAGNOSIS — N13.8 BENIGN PROSTATIC HYPERPLASIA WITH URINARY OBSTRUCTION: ICD-10-CM

## 2018-03-23 DIAGNOSIS — R97.20 ELEVATED PROSTATE SPECIFIC ANTIGEN (PSA): Primary | ICD-10-CM

## 2018-03-23 DIAGNOSIS — N40.1 BENIGN PROSTATIC HYPERPLASIA WITH URINARY OBSTRUCTION: ICD-10-CM

## 2018-03-23 LAB
BILIRUB BLD-MCNC: NEGATIVE MG/DL
CLARITY, POC: CLEAR
COLOR UR: YELLOW
GLUCOSE UR STRIP-MCNC: NEGATIVE MG/DL
KETONES UR QL: NEGATIVE
LEUKOCYTE EST, POC: NEGATIVE
NITRITE UR-MCNC: NEGATIVE MG/ML
PH UR: 7 [PH] (ref 5–8)
PROT UR STRIP-MCNC: NEGATIVE MG/DL
RBC # UR STRIP: NEGATIVE /UL
SP GR UR: 1.02 (ref 1–1.03)
UROBILINOGEN UR QL: NORMAL

## 2018-03-23 PROCEDURE — 99213 OFFICE O/P EST LOW 20 MIN: CPT | Performed by: UROLOGY

## 2018-03-23 RX ORDER — FINASTERIDE 5 MG/1
5 TABLET, FILM COATED ORAL DAILY
Qty: 30 TABLET | Refills: 11 | Status: SHIPPED | OUTPATIENT
Start: 2018-03-23 | End: 2019-03-23

## 2018-03-23 RX ORDER — DOXEPIN HYDROCHLORIDE 25 MG/1
CAPSULE ORAL
COMMUNITY
Start: 2018-01-31

## 2018-03-23 RX ORDER — BETAMETHASONE DIPROPIONATE 0.5 MG/G
LOTION TOPICAL
COMMUNITY
Start: 2018-01-20

## 2018-03-23 RX ORDER — AMOXICILLIN 500 MG/1
CAPSULE ORAL
COMMUNITY
Start: 2018-03-08

## 2018-03-23 RX ORDER — ALPRAZOLAM 1 MG/1
TABLET ORAL
COMMUNITY
Start: 2018-01-11

## 2018-03-23 RX ORDER — OXYCODONE AND ACETAMINOPHEN 10; 325 MG/1; MG/1
TABLET ORAL
COMMUNITY
Start: 2018-03-08

## 2018-03-23 RX ORDER — AMLODIPINE BESYLATE 2.5 MG/1
TABLET ORAL
COMMUNITY
Start: 2018-03-14

## 2018-03-23 RX ORDER — IBUPROFEN 600 MG/1
TABLET ORAL
COMMUNITY
Start: 2018-03-08

## 2018-03-23 NOTE — PROGRESS NOTES
Chief Complaint  Elevated PSA (6 months fup.)        JOLEEN Beltrán is a 62 y.o. male who returns today for follow-up with his known enlarged prostate symptoms of outlet obstruction and elevated PSA.  Fortunately his digital rectal exam was benign and after taking Proscar his PSA has steadily decreased most recently measured at 4.2 which is normal for his age.  He states his primary care provider increased his Flomax to 1 capsule twice a day and he is currently voiding without difficulty.  He is warned about possible side effects from this extra medication.    Vitals:    03/23/18 1018   BP: 126/77   Pulse: 68   Resp: 16   Temp: 98.6 °F (37 °C)   SpO2: 98%       Past Medical History  Past Medical History:   Diagnosis Date   • Arthritis    • Enlarged prostate    • Kidney stone        Past Surgical History  Past Surgical History:   Procedure Laterality Date   • KIDNEY STONE SURGERY         Medications  has a current medication list which includes the following prescription(s): alprazolam, amlodipine, betamethasone dipropionate, clobetasol, doxepin, finasteride, hydrocodone-acetaminophen, ibuprofen, ketoconazole, ketoconazole, naproxen, tamsulosin, terbinafine, amoxicillin, and oxycodone-acetaminophen.      Allergies  No Known Allergies    Social History  Social History     Social History Narrative   • No narrative on file       Family History  He has no family history of bladder or kidney cancer  He has no family history of kidney stones      AUA Symptom Score:      Review of Systems  Review of Systems   Constitutional: Negative.    Genitourinary: Negative.    All other systems reviewed and are negative.      Physical Exam  Physical Exam    Labs Recent and today in the office:  Results for orders placed or performed in visit on 03/23/18   POC Urinalysis Dipstick, Automated   Result Value Ref Range    Color Yellow Yellow, Straw, Dark Yellow, Alison    Clarity, UA Clear Clear    Glucose, UA Negative Negative, 1000 mg/dL (3+)  mg/dL    Bilirubin Negative Negative    Ketones, UA Negative Negative    Specific Gravity  1.020 1.005 - 1.030    Blood, UA Negative Negative    pH, Urine 7.0 5.0 - 8.0    Protein, POC Negative Negative mg/dL    Urobilinogen, UA Normal Normal    Leukocytes Negative Negative    Nitrite, UA Negative Negative         Assessment & Plan  #1 elevated PSA: Patient is informed that a prostate biopsy would be required to rule out prostate cancer although he doesn't seem to be at very high risk.  He declines offer a biopsy in any case stating he's being treated with radiation for throat cancer and is not worried about his prostate.    #2 BPH: Currently voiding without difficulty on Proscar and Flomax so he'll return in 6 months for reevaluation with repeat PSA.  He is worried about his insurance and deductibles when he switches over to Medicare